# Patient Record
Sex: MALE | Race: OTHER | Employment: FULL TIME | URBAN - METROPOLITAN AREA
[De-identification: names, ages, dates, MRNs, and addresses within clinical notes are randomized per-mention and may not be internally consistent; named-entity substitution may affect disease eponyms.]

---
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
## 2019-06-12 ENCOUNTER — HOSPITAL ENCOUNTER (EMERGENCY)
Facility: HOSPITAL | Age: 19
Discharge: HOME | End: 2019-06-12
Attending: EMERGENCY MEDICINE

## 2019-06-12 VITALS
HEART RATE: 78 BPM | WEIGHT: 191 LBS | RESPIRATION RATE: 14 BRPM | BODY MASS INDEX: 24.51 KG/M2 | OXYGEN SATURATION: 98 % | DIASTOLIC BLOOD PRESSURE: 56 MMHG | TEMPERATURE: 98 F | SYSTOLIC BLOOD PRESSURE: 127 MMHG | HEIGHT: 74 IN

## 2019-06-12 DIAGNOSIS — N30.01 ACUTE CYSTITIS WITH HEMATURIA: Primary | ICD-10-CM

## 2019-06-12 PROCEDURE — 99283 EMERGENCY DEPT VISIT LOW MDM: CPT

## 2019-06-12 PROCEDURE — 81001 URINALYSIS AUTO W/SCOPE: CPT | Performed by: EMERGENCY MEDICINE

## 2019-06-12 PROCEDURE — 87086 URINE CULTURE/COLONY COUNT: CPT | Performed by: EMERGENCY MEDICINE

## 2019-06-12 PROCEDURE — 87591 N.GONORRHOEAE DNA AMP PROB: CPT | Performed by: EMERGENCY MEDICINE

## 2019-06-12 PROCEDURE — 87491 CHLMYD TRACH DNA AMP PROBE: CPT | Performed by: EMERGENCY MEDICINE

## 2019-06-12 RX ORDER — NITROFURANTOIN 25; 75 MG/1; MG/1
100 CAPSULE ORAL 2 TIMES DAILY
Qty: 10 CAPSULE | Refills: 0 | Status: SHIPPED | OUTPATIENT
Start: 2019-06-12 | End: 2019-06-17

## 2019-06-12 NOTE — ED NOTES
Pt here with c/o bleeding from the penis since last night. Denies pain or burning with urination. No flank or abd pain. per his wife his penis was bleeding and compared to a women having her period.

## 2019-06-12 NOTE — ED INITIAL ASSESSMENT (HPI)
Patient complain of bleeding from his private area. Patient is also complaining of mid abdominal pain that started yesterday.

## 2019-06-12 NOTE — ED PROVIDER NOTES
Patient Seen in: [address] [place] [address] Emergency Department    History   Patient presents with:  Bleeding (hematologic)    Stated Complaint: bleeding from his penis    HPI    21-year-old male with no significant past medical history presents to the emergency currently  Musculoskeletal: Normal range of motion. No deformity. Lymphadenopathy: No sig cervical LAD   Neurological: Awake, alert. Normal reflexes. No cranial nerve deficit. Skin: Skin is warm and dry. No rash noted. No erythema.    Psychiatric:    E

## 2019-06-24 ENCOUNTER — HOSPITAL ENCOUNTER (EMERGENCY)
Facility: HOSPITAL | Age: 19
Discharge: HOME | End: 2019-06-24
Attending: EMERGENCY MEDICINE

## 2019-06-24 VITALS
HEIGHT: 75 IN | RESPIRATION RATE: 16 BRPM | HEART RATE: 64 BPM | WEIGHT: 189 LBS | DIASTOLIC BLOOD PRESSURE: 83 MMHG | BODY MASS INDEX: 23.5 KG/M2 | TEMPERATURE: 98 F | OXYGEN SATURATION: 99 % | SYSTOLIC BLOOD PRESSURE: 118 MMHG

## 2019-06-24 DIAGNOSIS — M79.10 MYALGIA: ICD-10-CM

## 2019-06-24 DIAGNOSIS — M54.9 BACK PAIN WITHOUT RADIATION: Primary | ICD-10-CM

## 2019-06-24 PROCEDURE — 81003 URINALYSIS AUTO W/O SCOPE: CPT | Performed by: EMERGENCY MEDICINE

## 2019-06-24 PROCEDURE — 80048 BASIC METABOLIC PNL TOTAL CA: CPT | Performed by: EMERGENCY MEDICINE

## 2019-06-24 PROCEDURE — 85025 COMPLETE CBC W/AUTO DIFF WBC: CPT | Performed by: EMERGENCY MEDICINE

## 2019-06-24 PROCEDURE — 36415 COLL VENOUS BLD VENIPUNCTURE: CPT

## 2019-06-24 PROCEDURE — 99283 EMERGENCY DEPT VISIT LOW MDM: CPT

## 2019-06-24 RX ORDER — CYCLOBENZAPRINE HCL 10 MG
10 TABLET ORAL 3 TIMES DAILY PRN
Qty: 20 TABLET | Refills: 0 | Status: SHIPPED | OUTPATIENT
Start: 2019-06-24 | End: 2019-07-01

## 2019-06-24 RX ORDER — IBUPROFEN 600 MG/1
600 TABLET ORAL EVERY 6 HOURS PRN
Qty: 30 TABLET | Refills: 0 | Status: SHIPPED | OUTPATIENT
Start: 2019-06-24 | End: 2019-07-01

## 2019-06-24 NOTE — ED INITIAL ASSESSMENT (HPI)
PAIN AND BODY ACHED FOR OVER A MONTH, LEFT SIDE BODY PAINS, DENIES FEVERS OR ANY ADDITIONAL COMPLAINTS

## 2019-06-26 NOTE — ED PROVIDER NOTES
Patient Seen in: [facility] Emergency Department    History   Patient presents with:  Checkup    Stated Complaint: BODY ACHES    HPI    22year old Vietnamese-speaking male who recently moved here from [facility] and now presents with 1 month of lef Conjunctivae and EOM are normal.   Neck: Normal range of motion. Neck supple. Cardiovascular: Normal rate, regular rhythm, normal heart sounds and intact distal pulses.    Pulses:       Dorsalis pedis pulses are 2+ on the right side, and 2+ on the left si display    Labs reassuring, pt well-appearing. No signs of injury or infection. 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  Sister familiar with [facility] and states she will take pt there to est

## 2020-03-17 ENCOUNTER — HOSPITAL ENCOUNTER (EMERGENCY)
Facility: HOSPITAL | Age: 20
Discharge: HOME | End: 2020-03-17
Attending: EMERGENCY MEDICINE
Payer: MEDICAID

## 2020-03-17 VITALS
HEART RATE: 74 BPM | OXYGEN SATURATION: 100 % | WEIGHT: 180 LBS | RESPIRATION RATE: 18 BRPM | BODY MASS INDEX: 24.38 KG/M2 | TEMPERATURE: 98 F | HEIGHT: 72 IN

## 2020-03-17 DIAGNOSIS — J06.9 UPPER RESPIRATORY TRACT INFECTION, UNSPECIFIED TYPE: Primary | ICD-10-CM

## 2020-03-17 LAB — S PYO AG THROAT QL: NEGATIVE

## 2020-03-17 PROCEDURE — 99283 EMERGENCY DEPT VISIT LOW MDM: CPT

## 2020-03-17 PROCEDURE — 87430 STREP A AG IA: CPT

## 2020-03-17 RX ORDER — ONDANSETRON 4 MG/1
4 TABLET, ORALLY DISINTEGRATING ORAL EVERY 4 HOURS PRN
Qty: 10 TABLET | Refills: 0 | Status: SHIPPED | OUTPATIENT
Start: 2020-03-17 | End: 2020-03-24

## 2020-03-17 RX ORDER — ONDANSETRON 4 MG/1
4 TABLET, ORALLY DISINTEGRATING ORAL ONCE
Status: COMPLETED | OUTPATIENT
Start: 2020-03-17 | End: 2020-03-17

## 2020-03-17 NOTE — ED PROVIDER NOTES
Patient Seen in: [facility] AND [facility] Emergency Department      History   Patient presents with:  Cough/URI    Stated Complaint: Cough    HPI    79-year-old male without significant past medical history presents with complaints of cough, sore throat, and on neck is supple non tender        Extremities are symmetrical, full range of motion  Psychiatric: patient is oriented X 3, there is no agitation    ED Course     Labs Reviewed   EMH POCT RAPID STREP - Normal                MDM     Rapid strep negative.   Phani Baum

## 2020-11-16 ENCOUNTER — HOSPITAL ENCOUNTER (EMERGENCY)
Facility: HOSPITAL | Age: 20
Discharge: HOME | End: 2020-11-16
Payer: MEDICAID

## 2020-11-16 VITALS
WEIGHT: 180 LBS | HEIGHT: 74 IN | HEART RATE: 66 BPM | OXYGEN SATURATION: 99 % | TEMPERATURE: 98 F | SYSTOLIC BLOOD PRESSURE: 117 MMHG | BODY MASS INDEX: 23.1 KG/M2 | RESPIRATION RATE: 18 BRPM | DIASTOLIC BLOOD PRESSURE: 71 MMHG

## 2020-11-16 DIAGNOSIS — Z20.822 ENCOUNTER FOR LABORATORY TESTING FOR COVID-19 VIRUS: ICD-10-CM

## 2020-11-16 DIAGNOSIS — B34.9 VIRAL ILLNESS: Primary | ICD-10-CM

## 2020-11-16 PROCEDURE — 87430 STREP A AG IA: CPT

## 2020-11-16 PROCEDURE — 99283 EMERGENCY DEPT VISIT LOW MDM: CPT

## 2020-11-17 NOTE — ED NOTES
Patient cleared for discharge by NP. Patient verbalizes understanding of discharge instructions and covid 19 education. Patient given a covid work note. Patient assisted with mychart set up. Patient ambulatory with a steady gait upon discharge.

## 2020-11-17 NOTE — ED PROVIDER NOTES
Patient Seen in: [facility] AND [facility] Emergency Department      History   Patient presents with:  Abdomen/Flank Pain    Stated Complaint: abd pain    HPI    18-year-old male presents to the emergency department with diffuse generalized abdominal pain and di rash.   Neurological:      Mental Status: He is alert and oriented to person, place, and time.                ED Course     Labs Reviewed   EM POCT RAPID STREP - Normal   RAINBOW DRAW BLUE   RAINBOW DRAW LAVENDER   RAINBOW DRAW LIGHT GREEN   RAINBOW DRAW G

## 2020-11-17 NOTE — ED INITIAL ASSESSMENT (HPI)
C/o lower abd pain with diarrhea x1 day. Decreased appetite. Denies fevers. +Sore throat. Took pepto without relief of s/s.

## 2021-02-01 ENCOUNTER — APPOINTMENT (OUTPATIENT)
Dept: GENERAL RADIOLOGY | Facility: HOSPITAL | Age: 21
End: 2021-02-01
Attending: PEDIATRICS
Payer: MEDICAID

## 2021-02-01 ENCOUNTER — HOSPITAL ENCOUNTER (EMERGENCY)
Facility: HOSPITAL | Age: 21
Discharge: HOME | End: 2021-02-01
Attending: PEDIATRICS
Payer: MEDICAID

## 2021-02-01 ENCOUNTER — APPOINTMENT (OUTPATIENT)
Dept: CT IMAGING | Facility: HOSPITAL | Age: 21
End: 2021-02-01
Attending: PEDIATRICS
Payer: MEDICAID

## 2021-02-01 VITALS
WEIGHT: 180 LBS | HEART RATE: 62 BPM | HEIGHT: 74 IN | TEMPERATURE: 97 F | OXYGEN SATURATION: 99 % | SYSTOLIC BLOOD PRESSURE: 112 MMHG | RESPIRATION RATE: 16 BRPM | BODY MASS INDEX: 23.1 KG/M2 | DIASTOLIC BLOOD PRESSURE: 74 MMHG

## 2021-02-01 DIAGNOSIS — B34.9 VIRAL SYNDROME: Primary | ICD-10-CM

## 2021-02-01 DIAGNOSIS — R81 GLUCOSURIA: ICD-10-CM

## 2021-02-01 LAB
ALBUMIN SERPL-MCNC: 3.3 G/DL (ref 3.4–5)
ALBUMIN/GLOB SERPL: 1.3 {RATIO} (ref 1–2)
ALP LIVER SERPL-CCNC: 66 U/L
ALT SERPL-CCNC: 25 U/L
ANION GAP SERPL CALC-SCNC: 5 MMOL/L (ref 0–18)
AST SERPL-CCNC: 36 U/L (ref 15–37)
BASOPHILS # BLD AUTO: 0.05 X10(3) UL (ref 0–0.2)
BASOPHILS NFR BLD AUTO: 0.7 %
BILIRUB SERPL-MCNC: 0.2 MG/DL (ref 0.1–2)
BILIRUB UR QL STRIP.AUTO: NEGATIVE
BUN BLD-MCNC: 10 MG/DL (ref 7–18)
BUN/CREAT SERPL: 11.4 (ref 10–20)
CALCIUM BLD-MCNC: 9 MG/DL (ref 8.5–10.1)
CHLORIDE SERPL-SCNC: 108 MMOL/L (ref 98–112)
CLARITY UR REFRACT.AUTO: CLEAR
CO2 SERPL-SCNC: 27 MMOL/L (ref 21–32)
CREAT BLD-MCNC: 0.88 MG/DL
D-DIMER: <0.27 UG/ML FEU (ref ?–0.5)
DEPRECATED RDW RBC AUTO: 37.1 FL (ref 35.1–46.3)
EOSINOPHIL # BLD AUTO: 0.29 X10(3) UL (ref 0–0.7)
EOSINOPHIL NFR BLD AUTO: 4.1 %
ERYTHROCYTE [DISTWIDTH] IN BLOOD BY AUTOMATED COUNT: 11.6 % (ref 11–15)
GLOBULIN PLAS-MCNC: 2.5 G/DL (ref 2.8–4.4)
GLUCOSE BLD-MCNC: 119 MG/DL (ref 70–99)
GLUCOSE BLD-MCNC: 124 MG/DL (ref 70–99)
GLUCOSE UR STRIP.AUTO-MCNC: >=500 MG/DL
HCT VFR BLD AUTO: 41.8 %
HGB BLD-MCNC: 14.9 G/DL
IMM GRANULOCYTES # BLD AUTO: 0.01 X10(3) UL (ref 0–1)
IMM GRANULOCYTES NFR BLD: 0.1 %
KETONES UR STRIP.AUTO-MCNC: NEGATIVE MG/DL
LEUKOCYTE ESTERASE UR QL STRIP.AUTO: NEGATIVE
LIPASE SERPL-CCNC: 65 U/L (ref 73–393)
LYMPHOCYTES # BLD AUTO: 2.56 X10(3) UL (ref 1–4)
LYMPHOCYTES NFR BLD AUTO: 36.6 %
M PROTEIN MFR SERPL ELPH: 5.8 G/DL (ref 6.4–8.2)
MCH RBC QN AUTO: 31.4 PG (ref 26–34)
MCHC RBC AUTO-ENTMCNC: 35.6 G/DL (ref 31–37)
MCV RBC AUTO: 88.2 FL
MONOCYTES # BLD AUTO: 0.37 X10(3) UL (ref 0.1–1)
MONOCYTES NFR BLD AUTO: 5.3 %
NEUTROPHILS # BLD AUTO: 3.71 X10 (3) UL (ref 1.5–7.7)
NEUTROPHILS # BLD AUTO: 3.71 X10(3) UL (ref 1.5–7.7)
NEUTROPHILS NFR BLD AUTO: 53.2 %
NITRITE UR QL STRIP.AUTO: NEGATIVE
OSMOLALITY SERPL CALC.SUM OF ELEC: 290 MOSM/KG (ref 275–295)
PH UR STRIP.AUTO: 6 [PH] (ref 4.5–8)
PLATELET # BLD AUTO: 269 10(3)UL (ref 150–450)
POTASSIUM SERPL-SCNC: 3.3 MMOL/L (ref 3.5–5.1)
PROT UR STRIP.AUTO-MCNC: NEGATIVE MG/DL
RBC # BLD AUTO: 4.74 X10(6)UL
RBC UR QL AUTO: NEGATIVE
SODIUM SERPL-SCNC: 140 MMOL/L (ref 136–145)
SP GR UR STRIP.AUTO: 1.01 (ref 1–1.03)
TROPONIN I SERPL-MCNC: <0.045 NG/ML (ref ?–0.04)
UROBILINOGEN UR STRIP.AUTO-MCNC: <2 MG/DL
WBC # BLD AUTO: 7 X10(3) UL (ref 4–11)

## 2021-02-01 PROCEDURE — 99285 EMERGENCY DEPT VISIT HI MDM: CPT

## 2021-02-01 PROCEDURE — 81003 URINALYSIS AUTO W/O SCOPE: CPT | Performed by: PEDIATRICS

## 2021-02-01 PROCEDURE — 71045 X-RAY EXAM CHEST 1 VIEW: CPT | Performed by: PEDIATRICS

## 2021-02-01 PROCEDURE — 74177 CT ABD & PELVIS W/CONTRAST: CPT | Performed by: PEDIATRICS

## 2021-02-01 PROCEDURE — 96360 HYDRATION IV INFUSION INIT: CPT

## 2021-02-01 PROCEDURE — 84484 ASSAY OF TROPONIN QUANT: CPT | Performed by: PEDIATRICS

## 2021-02-01 PROCEDURE — 80053 COMPREHEN METABOLIC PANEL: CPT | Performed by: PEDIATRICS

## 2021-02-01 PROCEDURE — 93005 ELECTROCARDIOGRAM TRACING: CPT

## 2021-02-01 PROCEDURE — 85025 COMPLETE CBC W/AUTO DIFF WBC: CPT | Performed by: PEDIATRICS

## 2021-02-01 PROCEDURE — 83690 ASSAY OF LIPASE: CPT | Performed by: PEDIATRICS

## 2021-02-01 PROCEDURE — 82962 GLUCOSE BLOOD TEST: CPT

## 2021-02-01 PROCEDURE — 93010 ELECTROCARDIOGRAM REPORT: CPT

## 2021-02-01 PROCEDURE — 85379 FIBRIN DEGRADATION QUANT: CPT | Performed by: PEDIATRICS

## 2021-02-02 LAB
ATRIAL RATE: 58 BPM
P AXIS: 59 DEGREES
P-R INTERVAL: 138 MS
Q-T INTERVAL: 424 MS
QRS DURATION: 98 MS
QTC CALCULATION (BEZET): 416 MS
R AXIS: 33 DEGREES
SARS-COV-2 RNA RESP QL NAA+PROBE: NOT DETECTED
T AXIS: 45 DEGREES
VENTRICULAR RATE: 58 BPM

## 2021-02-02 NOTE — ED PROVIDER NOTES
Patient Seen in: [facility] Emergency Department      History   Patient presents with:  Cough/URI    Stated Complaint: christal/cough    HPI/Subjective:   HPI    25-year-old male previously healthy here with 3-day history of cough and pleuritic chest grey Exam  Vitals signs and nursing note reviewed. Constitutional:       General: He is not in acute distress. Appearance: Normal appearance. He is well-developed and normal weight. He is not ill-appearing, toxic-appearing or diaphoretic.    CT:      Veena Orlando No tenderness. Lymphadenopathy:      Cervical: No cervical adenopathy. Skin:     General: Skin is warm. Capillary Refill: Capillary refill takes less than 2 seconds. Coloration: Skin is not pale. Findings: No erythema or rash.    Neurolog DIFFERENTIAL     EKG    Rate, intervals and axes as noted on EKG Report.   Rate: 58  Rhythm: Sinus Rhythm  Reading: normal sinus              Radiology:  Any imaging ordered independently visualized and interpreted by myself, along with review of radiologis tenderness epigastric and bilateral upper quadrant. EKG showed mild sinus bradycardia with heart rate of 58. Portable chest x-ray no abnormal findings. Did place IV for labs. CBC showed white blood cell count of 7.0 without left shift.   CMP unremarkabl

## 2021-02-02 NOTE — ED INITIAL ASSESSMENT (HPI)
Pt with cough started this am, difficulty breathing with chest tightness radiating to back. Deny fever. Deny known exposure to covid.

## 2021-05-21 ENCOUNTER — HOSPITAL ENCOUNTER (EMERGENCY)
Facility: HOSPITAL | Age: 21
Discharge: HOME | End: 2021-05-22
Attending: EMERGENCY MEDICINE
Payer: MEDICAID

## 2021-05-21 DIAGNOSIS — R55 SYNCOPE AND COLLAPSE: Primary | ICD-10-CM

## 2021-05-21 PROCEDURE — 93010 ELECTROCARDIOGRAM REPORT: CPT | Performed by: EMERGENCY MEDICINE

## 2021-05-21 PROCEDURE — 96360 HYDRATION IV INFUSION INIT: CPT

## 2021-05-21 PROCEDURE — 99284 EMERGENCY DEPT VISIT MOD MDM: CPT

## 2021-05-21 PROCEDURE — 93005 ELECTROCARDIOGRAM TRACING: CPT

## 2021-05-21 PROCEDURE — 80076 HEPATIC FUNCTION PANEL: CPT | Performed by: EMERGENCY MEDICINE

## 2021-05-21 PROCEDURE — 80048 BASIC METABOLIC PNL TOTAL CA: CPT | Performed by: EMERGENCY MEDICINE

## 2021-05-21 PROCEDURE — 85025 COMPLETE CBC W/AUTO DIFF WBC: CPT | Performed by: EMERGENCY MEDICINE

## 2021-05-21 PROCEDURE — 82962 GLUCOSE BLOOD TEST: CPT

## 2021-05-22 VITALS
DIASTOLIC BLOOD PRESSURE: 81 MMHG | TEMPERATURE: 98 F | RESPIRATION RATE: 18 BRPM | OXYGEN SATURATION: 99 % | SYSTOLIC BLOOD PRESSURE: 106 MMHG | HEART RATE: 47 BPM

## 2021-05-22 PROCEDURE — 82077 ASSAY SPEC XCP UR&BREATH IA: CPT | Performed by: EMERGENCY MEDICINE

## 2021-05-22 PROCEDURE — 80307 DRUG TEST PRSMV CHEM ANLYZR: CPT | Performed by: EMERGENCY MEDICINE

## 2021-05-22 RX ORDER — ONDANSETRON 2 MG/ML
4 INJECTION INTRAMUSCULAR; INTRAVENOUS ONCE
Status: DISCONTINUED | OUTPATIENT
Start: 2021-05-22 | End: 2021-05-22

## 2021-05-22 RX ORDER — MORPHINE SULFATE 4 MG/ML
4 INJECTION, SOLUTION INTRAMUSCULAR; INTRAVENOUS ONCE
Status: DISCONTINUED | OUTPATIENT
Start: 2021-05-22 | End: 2021-05-22

## 2021-05-22 NOTE — ED PROVIDER NOTES
Patient Seen in: [facility] AND [facility] Emergency Department    History   Patient presents with:  Syncope      HPI    25-year-old male presents the ER for syncopal episode. Patient with past medical history of newly diagnosed type I diabetic.   Patient's family and any equipment used during my examination was cleaned with super sani-cloth germicidal wipes following the exam.     Physical Exam  Vitals and nursing note reviewed. Constitutional:       Appearance: He is well-developed.    HENT:      Head: Normocepha -----------         ------                     CBC W/ DIFFERENTIAL[[id number]]                              Final result                 Please view results for these tests on the individual orders.    DRUG ABUSE PANEL 10 SCREEN   CBC W/ DIFFERENTIAL     EKG diagnosis)    Disposition:  Discharge    Follow-up:  Christy May MD  [address] [address]  [address] [facility] [phone]    Schedule an appointment as soon as possible for a visit  If symptoms worsen      Medications Prescribed:  Current D

## 2021-05-22 NOTE — ED INITIAL ASSESSMENT (HPI)
Syncopal episode X2 in the past 2 days. States he was recently diagnosed with diabetes in March but states he takes no meds or rarely checks blood sugar at home. Tonight after syncopal episode BS was 70.

## 2021-05-24 ENCOUNTER — OFFICE VISIT (OUTPATIENT)
Dept: INTERNAL MEDICINE CLINIC | Facility: CLINIC | Age: 21
End: 2021-05-24
Payer: MEDICAID

## 2021-05-24 VITALS
HEART RATE: 73 BPM | WEIGHT: 162 LBS | DIASTOLIC BLOOD PRESSURE: 75 MMHG | HEIGHT: 73 IN | SYSTOLIC BLOOD PRESSURE: 99 MMHG | BODY MASS INDEX: 21.47 KG/M2 | OXYGEN SATURATION: 98 %

## 2021-05-24 DIAGNOSIS — R81 GLYCOSURIA: ICD-10-CM

## 2021-05-24 DIAGNOSIS — E13.9 DIABETES 1.5, MANAGED AS TYPE 1 (HCC): ICD-10-CM

## 2021-05-24 DIAGNOSIS — R63.4 WEIGHT LOSS: Primary | ICD-10-CM

## 2021-05-24 DIAGNOSIS — R53.1 WEAKNESS: ICD-10-CM

## 2021-05-24 PROCEDURE — 3008F BODY MASS INDEX DOCD: CPT | Performed by: INTERNAL MEDICINE

## 2021-05-24 PROCEDURE — 3078F DIAST BP <80 MM HG: CPT | Performed by: INTERNAL MEDICINE

## 2021-05-24 PROCEDURE — 3074F SYST BP LT 130 MM HG: CPT | Performed by: INTERNAL MEDICINE

## 2021-05-24 PROCEDURE — 99204 OFFICE O/P NEW MOD 45 MIN: CPT | Performed by: INTERNAL MEDICINE

## 2021-05-24 NOTE — PROGRESS NOTES
HPI:    Patient ID: Jerrod Cox is a 23year old male. HPI    Patient is here to establish care. He was seen at ED on 5/21/2021 for syncopal episode. Glucose was 101. EKG showed sinus bradycardia with heart rate of 53.   Blood work, kenneth 18 mg/dL  21 (H)   CREATININE      0.70 - 1.30 mg/dL  0.84   BUN/CREAT Ratio      10.0 - 20.0  25.0 (H)   CALCIUM      8.5 - 10.1 mg/dL  8.9   CALCULATED OSMOLALITY      275 - 295 mOsm/kg  293   eGFR NON-AFR.  AMERICAN      >=60  125   eGFR  42.2   Lymphocytes %      %  42.8   Monocytes %      %  8.5   Eosinophils %      %  5.3   Basophils %      %  0.9   Immature Granulocyte %      %  0.3   Glucose      70 - 99 mg/dL  101 (H)   Sodium      136 - 145 mmol/L  140   Potassium      3.5 - 5.1 mmol surgical history on file.    Family History   Problem Relation Age of Onset   • Diabetes Father       Social History: Social History    Tobacco Use      Smoking status: Never Smoker      Smokeless tobacco: Never Used    Alcohol use: Not on file    Drug use: Nose: Nose normal.   Eyes:      General: No scleral icterus. Extraocular Movements: Extraocular movements intact. Conjunctiva/sclera: Conjunctivae normal.      Pupils: Pupils are equal, round, and reactive to light.    Cardiovascular:      Rate an the pubic symphysis with non-ionic intravenous contrast material. Post contrast coronal MPR imaging was performed.  Dose reduction techniques were used.  Dose information is   transmitted to the [facility] ([address] [place] [address] of Radiology) [facility] D Future    4. Diabetes 1.5, managed as type 1 ([facility] [address]  -Hba1c   - LIPID PANEL; Future  - MICROALB/CREAT RATIO, RANDOM URINE;  Future      Orders Placed This Encounter      TSH W Reflex To Free T4 [E]      Cortisol [E]      Urinalysis, Routine [E][If pt <2 yrs

## 2021-05-25 PROBLEM — R63.4 WEIGHT LOSS: Status: ACTIVE | Noted: 2021-05-25

## 2021-05-25 NOTE — PATIENT INSTRUCTIONS
Coma alimentos saludables para moralez corazón  Lo que come tiene un gran impacto en la kristy de moralez corazón. De hecho, si come de [employer] carmelita podrá disminuir muchos de larry riesgos cardíacos al [facility] [employer].  Por ejemplo, lo ayudará a [employer], larry n Salir a comer afuera con menos frecuencia y elegir menos alimentos procesados son dos excelentes maneras de reducir la cantidad de sal que consume. En moralez casa, use hierbas y especias, en vez de nurys, para darle sabor a las comidas.   · [address] Las grasas saludables pueden ser buenas si se consumen en pequeñas cantidades. Son las grasas no saturadas, hallie el aceite de [place], los sherlyn secos y los pescados.  Intente comer al menos 2 porciones a la semana de algún pescado graso, hallie bakari berry especias sin nurys, pruebe albahaca, cilantro, janel, comino, pimentón, tanna y marte. © 3007-6160 [employer] [address] Todos los derechos reservados.  Esta información no pretende sustituir la atención médica profesional. Sólo moralez médico puede viviana

## 2021-06-28 ENCOUNTER — TELEPHONE (OUTPATIENT)
Dept: INTERNAL MEDICINE CLINIC | Facility: CLINIC | Age: 21
End: 2021-06-28

## 2021-07-15 ENCOUNTER — OFFICE VISIT (OUTPATIENT)
Dept: INTERNAL MEDICINE CLINIC | Facility: CLINIC | Age: 21
End: 2021-07-15
Payer: MEDICAID

## 2021-07-15 VITALS
SYSTOLIC BLOOD PRESSURE: 100 MMHG | BODY MASS INDEX: 22.26 KG/M2 | DIASTOLIC BLOOD PRESSURE: 76 MMHG | WEIGHT: 168 LBS | HEART RATE: 101 BPM | HEIGHT: 73 IN | OXYGEN SATURATION: 98 %

## 2021-07-15 DIAGNOSIS — Z86.39 HISTORY OF DIABETES MELLITUS, TYPE I: ICD-10-CM

## 2021-07-15 DIAGNOSIS — M25.50 POLYARTHRALGIA: ICD-10-CM

## 2021-07-15 DIAGNOSIS — M54.50 ACUTE MIDLINE LOW BACK PAIN WITHOUT SCIATICA: Primary | ICD-10-CM

## 2021-07-15 PROCEDURE — 3074F SYST BP LT 130 MM HG: CPT | Performed by: INTERNAL MEDICINE

## 2021-07-15 PROCEDURE — 99214 OFFICE O/P EST MOD 30 MIN: CPT | Performed by: INTERNAL MEDICINE

## 2021-07-15 PROCEDURE — 3008F BODY MASS INDEX DOCD: CPT | Performed by: INTERNAL MEDICINE

## 2021-07-15 PROCEDURE — 3078F DIAST BP <80 MM HG: CPT | Performed by: INTERNAL MEDICINE

## 2021-07-15 RX ORDER — IBUPROFEN 600 MG/1
TABLET ORAL
Qty: 20 TABLET | Refills: 0 | Status: SHIPPED | OUTPATIENT
Start: 2021-07-15

## 2021-07-15 NOTE — PROGRESS NOTES
HPI:    Patient ID: Daria Charles is a 23year old male. HPI    Daria Charles is a 23year old year, who has type 1 diabetes mellitus presented with 1 week of low back pain. He is accompanied by wife and children. Polo Wilcox     He is Negative for leg swelling. Gastrointestinal: Negative for abdominal pain and nausea. Endocrine: Positive for polyuria. Negative for polydipsia. Genitourinary: Negative for dysuria and hematuria.    Musculoskeletal: Positive for arthralgias and back pa Musculoskeletal:      Cervical back: Normal range of motion. Comments: No lumbar spinal process tenderness. There is para spinal and bilateral gluteal tendereness. Straight leg test negative bilateral.  There is no motor and sensory deficit.    Rabia decision making.

## 2021-10-07 ENCOUNTER — TELEPHONE (OUTPATIENT)
Dept: FAMILY MEDICINE CLINIC | Facility: CLINIC | Age: 21
End: 2021-10-07

## 2021-10-07 ENCOUNTER — HOSPITAL ENCOUNTER (OUTPATIENT)
Age: 21
Discharge: HOME | End: 2021-10-07
Payer: MEDICAID

## 2021-10-07 VITALS
DIASTOLIC BLOOD PRESSURE: 67 MMHG | TEMPERATURE: 99 F | OXYGEN SATURATION: 98 % | HEART RATE: 88 BPM | SYSTOLIC BLOOD PRESSURE: 136 MMHG | RESPIRATION RATE: 14 BRPM

## 2021-10-07 DIAGNOSIS — S40.211A: Primary | ICD-10-CM

## 2021-10-07 PROCEDURE — 90471 IMMUNIZATION ADMIN: CPT

## 2021-10-07 PROCEDURE — 99213 OFFICE O/P EST LOW 20 MIN: CPT
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
## 2021-10-07 NOTE — TELEPHONE ENCOUNTER
Spoke to wife. Cancellation tomorrow at 8:40am.  Offered this to wife. She wants patient to be seen today, because patient is scheduled to go back to work tomorrow. Inquired with her if ER cleared patient to go back to work.   She states that ER only gave
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
## 2023-02-10 ENCOUNTER — TELEPHONE (OUTPATIENT)
Dept: INTERNAL MEDICINE CLINIC | Facility: CLINIC | Age: 23
End: 2023-02-10

## 2023-02-11 NOTE — TELEPHONE ENCOUNTER
Called number on file unable to leave message, phone rang and went to dial tone. Same thing with number for emergency contact unable to leave message went to dial tone. Tried calling patient to make an appointment to establish care or to ask if patient is seeing another provider to update file.      Sending a [employer] as well

## 2023-05-23 ENCOUNTER — TELEPHONE (OUTPATIENT)
Dept: INTERNAL MEDICINE CLINIC | Facility: CLINIC | Age: 23
End: 2023-05-23

## 2023-05-23 NOTE — TELEPHONE ENCOUNTER
Called patient to reach in regards to scheduling appointment and establishing care. If patient has a new pcp to please notify the office to update his profile. He will also need to call his insurance to change pcp to new updated provider.        Left message to call back

## 2023-11-15 ENCOUNTER — PATIENT OUTREACH (OUTPATIENT)
Dept: CASE MANAGEMENT | Age: 23
End: 2023-11-15

## 2023-11-15 NOTE — PROCEDURES
The office order for PCP removal request is Approved and finalized on November 15, 2023.     Thanks,  [facility] [employer]

## (undated) NOTE — LETTER
06/28/21        [address]  [id number]      Dear Mary Smyth,    Our records indicate that you have outstanding lab work and or testing that was ordered for you and has not yet been completed: Labs    To provide you with t

## (undated) NOTE — ED AVS SNAPSHOT
Nolan Gray   MRN: [id number]    Department:  [facility] Emergency Department   Date of Visit:  10/30/2018           Disclosure     Insurance plans vary and the physician(s) referred by the ER may not be covered by your plan.  Please contact y CARE PHYSICIAN AT ONCE OR RETURN IMMEDIATELY TO THE EMERGENCY DEPARTMENT. If you have been prescribed any medication(s), please fill your prescription right away and begin taking the medication(s) as directed.   If you believe that any of the medications

## (undated) NOTE — LETTER
Date & Time: 6/24/2019, 9:36 AM  Patient: Ashleigh Vargas  Encounter Provider(s):    Myriam Crawley MD       To Whom It May Concern:    Chilo Redding was seen and treated in our department on 6/24/2019. He may return to work 06/24/19.

## (undated) NOTE — LETTER
Date & Time: 3/17/2020, 7:29 AM  Patient: Rick JusticeRyangabe  Encounter Provider(s):    Adore Bacon MD       To Whom It May Concern:    Minnie Nina was seen and treated in our department on 3/17/2020.  He should not return to work until

## (undated) NOTE — LETTER
Date & Time: 6/12/2019, 8:33 AM  Patient: Benigno Syed  Encounter Provider(s):    Paulette Walden MD       To Whom It May Concern:    Benigno Syed was seen and treated in our department on 6/12/2019. He should not return to work until 6/13/19.     If yo

## (undated) NOTE — LETTER
Date & Time: 5/22/2021, 1:30 AM  Patient: Nae Press  Encounter Provider(s):    Elly Aparicio,        To Whom It May Concern:    Jordon Garza was seen and treated in our department on 5/21/2021.  He can return to work 5/22/202

## (undated) NOTE — LETTER
Date & Time: 10/7/2021, 4:22 PM  Patient: Staci Sinha  Encounter Provider(s):    Kimber Major       To Whom It May Concern:    Vanessa Isidro was seen and treated in our department on 10/7/2021.  He can return to work with these li

## (undated) NOTE — LETTER
Date & Time: 10/7/2021, 4:52 PM  Patient: Dawson Izquierdo  Encounter Provider(s):    Kimber Manuel       To Whom It May Concern:    Brett Rao was seen and treated in our department on 10/7/2021.  Patient can return to work on [employer]

## (undated) NOTE — ED AVS SNAPSHOT
Karely Valladares   MRN: [id number]    Department:  [facility] Emergency Department   Date of Visit:  6/24/2019           Disclosure     Insurance plans vary and the physician(s) referred by the ER may not be covered by your plan.  Jason CARE PHYSICIAN AT ONCE OR RETURN IMMEDIATELY TO THE EMERGENCY DEPARTMENT. If you have been prescribed any medication(s), please fill your prescription right away and begin taking the medication(s) as directed.   If you believe that any of the medications

## (undated) NOTE — ED AVS SNAPSHOT
Santos Guzman   MRN: [id number]    Department:  [facility] Emergency Department   Date of Visit:  11/11/2018           Disclosure     Insurance plans vary and the physician(s) referred by the ER may not be covered by your plan.  Please contact y CARE PHYSICIAN AT ONCE OR RETURN IMMEDIATELY TO THE EMERGENCY DEPARTMENT. If you have been prescribed any medication(s), please fill your prescription right away and begin taking the medication(s) as directed.   If you believe that any of the medications

## (undated) NOTE — ED AVS SNAPSHOT
Kerri Duenas   MRN: [id number]    Department:  [facility] Emergency Department   Date of Visit:  6/12/2019           Disclosure     Insurance plans vary and the physician(s) referred by the ER may not be covered by your plan.  Please contact yo CARE PHYSICIAN AT ONCE OR RETURN IMMEDIATELY TO THE EMERGENCY DEPARTMENT. If you have been prescribed any medication(s), please fill your prescription right away and begin taking the medication(s) as directed.   If you believe that any of the medications

## (undated) NOTE — LETTER
[place]  [id number] [phone]            Patient: Jeffry Del Toro   YOB: 2000   Date of Visit: 11/16/2020       [facility],       November 16, 2020      En ? Ching pasado al menos 10 días (20 días para las personas con grace enfermedad grave que requirió hospitalización) desde que iniciaron los síntomas y  ?  Ching pasado al menos 24 horas desde que se redujo la fiebre sin el uso de medicamentos para bajar la fiebre

## (undated) NOTE — ED AVS SNAPSHOT
Katya Child   MRN: [id number]    Department:  [facility] Emergency Department   Date of Visit:  3/17/2020           Disclosure     Insurance plans vary and the physician(s) referred by the ER may not be covered by your plan.  Jason CARE PHYSICIAN AT ONCE OR RETURN IMMEDIATELY TO THE EMERGENCY DEPARTMENT. If you have been prescribed any medication(s), please fill your prescription right away and begin taking the medication(s) as directed.   If you believe that any of the medications